# Patient Record
Sex: FEMALE | URBAN - METROPOLITAN AREA
[De-identification: names, ages, dates, MRNs, and addresses within clinical notes are randomized per-mention and may not be internally consistent; named-entity substitution may affect disease eponyms.]

---
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
## 2024-01-11 NOTE — PROCEDURE: TREATMENT REGIMEN
Detail Level: Zone
Plan: Reviewed hormone driven, discussed treatment options with birth control. Will start Orthotricyclen on day 1 start since cycle ended yesterday. Continue Epiduo Forte every other night. Will consider Spironolactone at follow up. Patient goes to [facility] ([place]).
Performed